# Patient Record
Sex: FEMALE | Race: OTHER | NOT HISPANIC OR LATINO | URBAN - METROPOLITAN AREA
[De-identification: names, ages, dates, MRNs, and addresses within clinical notes are randomized per-mention and may not be internally consistent; named-entity substitution may affect disease eponyms.]

---

## 2019-06-27 ENCOUNTER — OUTPATIENT (OUTPATIENT)
Dept: OUTPATIENT SERVICES | Facility: HOSPITAL | Age: 64
LOS: 1 days | Discharge: HOME | End: 2019-06-27
Payer: MEDICAID

## 2019-06-27 VITALS
TEMPERATURE: 98 F | DIASTOLIC BLOOD PRESSURE: 70 MMHG | HEIGHT: 63 IN | WEIGHT: 178.57 LBS | SYSTOLIC BLOOD PRESSURE: 160 MMHG | HEART RATE: 78 BPM | OXYGEN SATURATION: 98 % | RESPIRATION RATE: 22 BRPM

## 2019-06-27 DIAGNOSIS — Z90.49 ACQUIRED ABSENCE OF OTHER SPECIFIED PARTS OF DIGESTIVE TRACT: Chronic | ICD-10-CM

## 2019-06-27 DIAGNOSIS — Z98.891 HISTORY OF UTERINE SCAR FROM PREVIOUS SURGERY: Chronic | ICD-10-CM

## 2019-06-27 DIAGNOSIS — Z01.818 ENCOUNTER FOR OTHER PREPROCEDURAL EXAMINATION: ICD-10-CM

## 2019-06-27 DIAGNOSIS — K43.9 VENTRAL HERNIA WITHOUT OBSTRUCTION OR GANGRENE: ICD-10-CM

## 2019-06-27 LAB
ALBUMIN SERPL ELPH-MCNC: 4.1 G/DL — SIGNIFICANT CHANGE UP (ref 3.5–5.2)
ALP SERPL-CCNC: 51 U/L — SIGNIFICANT CHANGE UP (ref 30–115)
ALT FLD-CCNC: 17 U/L — SIGNIFICANT CHANGE UP (ref 0–41)
ANION GAP SERPL CALC-SCNC: 13 MMOL/L — SIGNIFICANT CHANGE UP (ref 7–14)
APPEARANCE UR: ABNORMAL
APTT BLD: 35.2 SEC — SIGNIFICANT CHANGE UP (ref 27–39.2)
AST SERPL-CCNC: 18 U/L — SIGNIFICANT CHANGE UP (ref 0–41)
BACTERIA # UR AUTO: ABNORMAL /HPF
BASOPHILS # BLD AUTO: 0.03 K/UL — SIGNIFICANT CHANGE UP (ref 0–0.2)
BASOPHILS NFR BLD AUTO: 0.6 % — SIGNIFICANT CHANGE UP (ref 0–1)
BILIRUB SERPL-MCNC: 0.7 MG/DL — SIGNIFICANT CHANGE UP (ref 0.2–1.2)
BILIRUB UR-MCNC: NEGATIVE — SIGNIFICANT CHANGE UP
BUN SERPL-MCNC: 12 MG/DL — SIGNIFICANT CHANGE UP (ref 10–20)
CALCIUM SERPL-MCNC: 9.3 MG/DL — SIGNIFICANT CHANGE UP (ref 8.5–10.1)
CHLORIDE SERPL-SCNC: 101 MMOL/L — SIGNIFICANT CHANGE UP (ref 98–110)
CO2 SERPL-SCNC: 27 MMOL/L — SIGNIFICANT CHANGE UP (ref 17–32)
COLOR SPEC: YELLOW — SIGNIFICANT CHANGE UP
CREAT SERPL-MCNC: 0.6 MG/DL — LOW (ref 0.7–1.5)
DIFF PNL FLD: ABNORMAL
EOSINOPHIL # BLD AUTO: 0.05 K/UL — SIGNIFICANT CHANGE UP (ref 0–0.7)
EOSINOPHIL NFR BLD AUTO: 1 % — SIGNIFICANT CHANGE UP (ref 0–8)
EPI CELLS # UR: ABNORMAL /HPF
GLUCOSE SERPL-MCNC: 136 MG/DL — HIGH (ref 70–99)
GLUCOSE UR QL: NEGATIVE — SIGNIFICANT CHANGE UP
HCT VFR BLD CALC: 46 % — SIGNIFICANT CHANGE UP (ref 37–47)
HGB BLD-MCNC: 14.9 G/DL — SIGNIFICANT CHANGE UP (ref 12–16)
IMM GRANULOCYTES NFR BLD AUTO: 0.4 % — HIGH (ref 0.1–0.3)
INR BLD: 1.03 RATIO — SIGNIFICANT CHANGE UP (ref 0.65–1.3)
KETONES UR-MCNC: NEGATIVE — SIGNIFICANT CHANGE UP
LEUKOCYTE ESTERASE UR-ACNC: NEGATIVE — SIGNIFICANT CHANGE UP
LYMPHOCYTES # BLD AUTO: 2.14 K/UL — SIGNIFICANT CHANGE UP (ref 1.2–3.4)
LYMPHOCYTES # BLD AUTO: 41.6 % — SIGNIFICANT CHANGE UP (ref 20.5–51.1)
MCHC RBC-ENTMCNC: 27.3 PG — SIGNIFICANT CHANGE UP (ref 27–31)
MCHC RBC-ENTMCNC: 32.4 G/DL — SIGNIFICANT CHANGE UP (ref 32–37)
MCV RBC AUTO: 84.4 FL — SIGNIFICANT CHANGE UP (ref 81–99)
MONOCYTES # BLD AUTO: 0.39 K/UL — SIGNIFICANT CHANGE UP (ref 0.1–0.6)
MONOCYTES NFR BLD AUTO: 7.6 % — SIGNIFICANT CHANGE UP (ref 1.7–9.3)
NEUTROPHILS # BLD AUTO: 2.51 K/UL — SIGNIFICANT CHANGE UP (ref 1.4–6.5)
NEUTROPHILS NFR BLD AUTO: 48.8 % — SIGNIFICANT CHANGE UP (ref 42.2–75.2)
NITRITE UR-MCNC: NEGATIVE — SIGNIFICANT CHANGE UP
NRBC # BLD: 0 /100 WBCS — SIGNIFICANT CHANGE UP (ref 0–0)
PH UR: 6 — SIGNIFICANT CHANGE UP (ref 5–8)
PLATELET # BLD AUTO: 126 K/UL — LOW (ref 130–400)
POTASSIUM SERPL-MCNC: 4.3 MMOL/L — SIGNIFICANT CHANGE UP (ref 3.5–5)
POTASSIUM SERPL-SCNC: 4.3 MMOL/L — SIGNIFICANT CHANGE UP (ref 3.5–5)
PROT SERPL-MCNC: 7.2 G/DL — SIGNIFICANT CHANGE UP (ref 6–8)
PROT UR-MCNC: 30
PROTHROM AB SERPL-ACNC: 11.8 SEC — SIGNIFICANT CHANGE UP (ref 9.95–12.87)
RBC # BLD: 5.45 M/UL — HIGH (ref 4.2–5.4)
RBC # FLD: 15.7 % — HIGH (ref 11.5–14.5)
RBC CASTS # UR COMP ASSIST: ABNORMAL /HPF
SODIUM SERPL-SCNC: 141 MMOL/L — SIGNIFICANT CHANGE UP (ref 135–146)
SP GR SPEC: 1.02 — SIGNIFICANT CHANGE UP (ref 1.01–1.03)
UROBILINOGEN FLD QL: 0.2 — SIGNIFICANT CHANGE UP (ref 0.2–0.2)
WBC # BLD: 5.14 K/UL — SIGNIFICANT CHANGE UP (ref 4.8–10.8)
WBC # FLD AUTO: 5.14 K/UL — SIGNIFICANT CHANGE UP (ref 4.8–10.8)
WBC UR QL: SIGNIFICANT CHANGE UP /HPF

## 2019-06-27 PROCEDURE — 71046 X-RAY EXAM CHEST 2 VIEWS: CPT | Mod: 26

## 2019-06-27 PROCEDURE — 93010 ELECTROCARDIOGRAM REPORT: CPT

## 2019-06-27 RX ORDER — TRAMADOL HYDROCHLORIDE 50 MG/1
1 TABLET ORAL
Qty: 0 | Refills: 0 | DISCHARGE

## 2019-06-27 RX ORDER — ISOSORBIDE DINITRATE 5 MG/1
1 TABLET ORAL
Qty: 0 | Refills: 0 | DISCHARGE

## 2019-06-27 NOTE — H&P PST ADULT - NSICDXPASTMEDICALHX_GEN_ALL_CORE_FT
PAST MEDICAL HISTORY:  H/O dull chest pain     HTN (hypertension)     Hypercholesteremia     Rheumatoid arthritis

## 2019-07-01 ENCOUNTER — RESULT REVIEW (OUTPATIENT)
Age: 64
End: 2019-07-01

## 2019-07-01 ENCOUNTER — OUTPATIENT (OUTPATIENT)
Dept: OUTPATIENT SERVICES | Facility: HOSPITAL | Age: 64
LOS: 1 days | Discharge: HOME | End: 2019-07-01
Payer: MEDICAID

## 2019-07-01 VITALS
RESPIRATION RATE: 84 BRPM | WEIGHT: 175.93 LBS | SYSTOLIC BLOOD PRESSURE: 107 MMHG | OXYGEN SATURATION: 94 % | TEMPERATURE: 97 F | HEIGHT: 63 IN | DIASTOLIC BLOOD PRESSURE: 70 MMHG | HEART RATE: 18 BPM

## 2019-07-01 VITALS — RESPIRATION RATE: 16 BRPM | HEART RATE: 67 BPM | DIASTOLIC BLOOD PRESSURE: 70 MMHG | SYSTOLIC BLOOD PRESSURE: 125 MMHG

## 2019-07-01 DIAGNOSIS — Z98.890 OTHER SPECIFIED POSTPROCEDURAL STATES: Chronic | ICD-10-CM

## 2019-07-01 DIAGNOSIS — Z90.49 ACQUIRED ABSENCE OF OTHER SPECIFIED PARTS OF DIGESTIVE TRACT: Chronic | ICD-10-CM

## 2019-07-01 DIAGNOSIS — Z98.891 HISTORY OF UTERINE SCAR FROM PREVIOUS SURGERY: Chronic | ICD-10-CM

## 2019-07-01 PROCEDURE — 88302 TISSUE EXAM BY PATHOLOGIST: CPT | Mod: 26

## 2019-07-01 RX ORDER — ONDANSETRON 8 MG/1
4 TABLET, FILM COATED ORAL ONCE
Refills: 0 | Status: COMPLETED | OUTPATIENT
Start: 2019-07-01 | End: 2019-07-01

## 2019-07-01 RX ORDER — ROSUVASTATIN CALCIUM 5 MG/1
1 TABLET ORAL
Qty: 0 | Refills: 0 | DISCHARGE

## 2019-07-01 RX ORDER — OXYCODONE HYDROCHLORIDE 5 MG/1
1 TABLET ORAL
Qty: 28 | Refills: 0
Start: 2019-07-01 | End: 2019-07-07

## 2019-07-01 RX ORDER — HYDROMORPHONE HYDROCHLORIDE 2 MG/ML
0.5 INJECTION INTRAMUSCULAR; INTRAVENOUS; SUBCUTANEOUS
Refills: 0 | Status: DISCONTINUED | OUTPATIENT
Start: 2019-07-01 | End: 2019-07-01

## 2019-07-01 RX ORDER — ISOSORBIDE MONONITRATE 60 MG/1
1 TABLET, EXTENDED RELEASE ORAL
Qty: 0 | Refills: 0 | DISCHARGE

## 2019-07-01 RX ORDER — LISINOPRIL 2.5 MG/1
1 TABLET ORAL
Qty: 0 | Refills: 0 | DISCHARGE

## 2019-07-01 RX ORDER — ASPIRIN/CALCIUM CARB/MAGNESIUM 324 MG
1 TABLET ORAL
Qty: 0 | Refills: 0 | DISCHARGE

## 2019-07-01 RX ORDER — ACETAMINOPHEN 500 MG
650 TABLET ORAL ONCE
Refills: 0 | Status: DISCONTINUED | OUTPATIENT
Start: 2019-07-01 | End: 2019-07-16

## 2019-07-01 RX ORDER — OXYCODONE AND ACETAMINOPHEN 5; 325 MG/1; MG/1
1 TABLET ORAL ONCE
Refills: 0 | Status: DISCONTINUED | OUTPATIENT
Start: 2019-07-01 | End: 2019-07-01

## 2019-07-01 RX ORDER — CHOLECALCIFEROL (VITAMIN D3) 125 MCG
1 CAPSULE ORAL
Qty: 0 | Refills: 0 | DISCHARGE

## 2019-07-01 RX ADMIN — ONDANSETRON 4 MILLIGRAM(S): 8 TABLET, FILM COATED ORAL at 16:47

## 2019-07-01 RX ADMIN — HYDROMORPHONE HYDROCHLORIDE 0.5 MILLIGRAM(S): 2 INJECTION INTRAMUSCULAR; INTRAVENOUS; SUBCUTANEOUS at 16:04

## 2019-07-01 NOTE — CHART NOTE - NSCHARTNOTEFT_GEN_A_CORE
PACU ANESTHESIA ADMISSION NOTE      Procedure:   Post op diagnosis:      ____  Intubated  TV:______       Rate: ______      FiO2: ______    ___x_  Patent Airway    __x__  Full return of protective reflexes    __x__  Full recovery from anesthesia / back to baseline status    Vitals:  T(C): 36.2 (07-01-19 @ 13:52), Max: 36.2 (07-01-19 @ 12:00)  HR: 18 (07-01-19 @ 13:52) (18 - 18)  BP: 107/70 (07-01-19 @ 13:52) (107/70 - 107/70)  RR: 84 (07-01-19 @ 13:52) (84 - 84)  SpO2: 94% (07-01-19 @ 13:52) (94% - 94%)    Mental Status:  _x___ Awake   __x___ Alert   _____ Drowsy   _____ Sedated    Nausea/Vomiting:  ____ NO  ___x___Yes,   See Post - Op Orders          Pain Scale (0-10):  _____    Treatment: ____ None    __x__ See Post - Op/PCA Orders    Post - Operative Fluids:   ____ Oral   __x__ See Post - Op Orders    Plan: Discharge:   x____Home       _____Floor     _____Critical Care    _____  Other:_________________    Comments: uneventful anesthesia course no complications. VItals stable. Pt transferred to PACU

## 2019-07-01 NOTE — ASU DISCHARGE PLAN (ADULT/PEDIATRIC) - CARE PROVIDER_API CALL
Iván Loyola)  Surgery  [address] [place] [address]  Phone: [phone]  Fax: [phone]  Follow Up Time:

## 2019-07-01 NOTE — PRE-ANESTHESIA EVALUATION ADULT - NSANTHOSAYNRD_GEN_A_CORE
see jared tool/No. JARED screening performed.  STOP BANG Legend: 0-2 = LOW Risk; 3-4 = INTERMEDIATE Risk; 5-8 = HIGH Risk

## 2019-07-01 NOTE — ASU DISCHARGE PLAN (ADULT/PEDIATRIC) - MEDICATION INSTRUCTIONS
tylenol 650 mg po q 8 hrs prn for mild pain and or ibuprofen 600 mg po q 8 hrs prn for moderate pain , oxycodone 5 mg po q 8 hrs prn for severe pain

## 2019-07-03 LAB — SURGICAL PATHOLOGY STUDY: SIGNIFICANT CHANGE UP

## 2019-07-09 DIAGNOSIS — K43.9 VENTRAL HERNIA WITHOUT OBSTRUCTION OR GANGRENE: ICD-10-CM

## 2020-01-06 NOTE — H&P PST ADULT - HISTORY OF PRESENT ILLNESS
Detail Level: Detailed Hide Additional Notes?: No 63 y/o female scheduled for ventral hernia repair with mesh  reports no c/o cp,sob,palpitations,cough or dysuria  1-2 fos without sob

## 2021-05-08 PROBLEM — M06.9 RHEUMATOID ARTHRITIS, UNSPECIFIED: Chronic | Status: ACTIVE | Noted: 2019-06-27

## 2021-05-08 PROBLEM — E78.00 PURE HYPERCHOLESTEROLEMIA, UNSPECIFIED: Chronic | Status: ACTIVE | Noted: 2019-06-27

## 2021-05-08 PROBLEM — I10 ESSENTIAL (PRIMARY) HYPERTENSION: Chronic | Status: ACTIVE | Noted: 2019-06-27

## 2021-05-08 PROBLEM — Z87.898 PERSONAL HISTORY OF OTHER SPECIFIED CONDITIONS: Chronic | Status: ACTIVE | Noted: 2019-06-27

## 2021-06-28 PROBLEM — Z00.00 ENCOUNTER FOR PREVENTIVE HEALTH EXAMINATION: Status: ACTIVE | Noted: 2021-06-28

## 2021-07-06 ENCOUNTER — APPOINTMENT (OUTPATIENT)
Dept: ENDOCRINOLOGY | Facility: CLINIC | Age: 66
End: 2021-07-06

## 2021-11-29 ENCOUNTER — APPOINTMENT (OUTPATIENT)
Dept: ENDOCRINOLOGY | Facility: CLINIC | Age: 66
End: 2021-11-29

## 2022-08-31 ENCOUNTER — APPOINTMENT (OUTPATIENT)
Dept: OBGYN | Facility: CLINIC | Age: 67
End: 2022-08-31

## 2022-08-31 ENCOUNTER — NON-APPOINTMENT (OUTPATIENT)
Age: 67
End: 2022-08-31

## 2022-08-31 VITALS
HEIGHT: 63 IN | BODY MASS INDEX: 31.18 KG/M2 | DIASTOLIC BLOOD PRESSURE: 82 MMHG | SYSTOLIC BLOOD PRESSURE: 120 MMHG | WEIGHT: 176 LBS | HEART RATE: 84 BPM

## 2022-08-31 DIAGNOSIS — N85.2 HYPERTROPHY OF UTERUS: ICD-10-CM

## 2022-08-31 LAB
BILIRUB UR QL STRIP: NEGATIVE
CLARITY UR: CLEAR
COLLECTION METHOD: NORMAL
GLUCOSE UR-MCNC: NEGATIVE
HCG UR QL: 0.2 EU/DL
HGB UR QL STRIP.AUTO: NORMAL
KETONES UR-MCNC: NEGATIVE
LEUKOCYTE ESTERASE UR QL STRIP: NORMAL
NITRITE UR QL STRIP: NEGATIVE
PH UR STRIP: 5.5
PROT UR STRIP-MCNC: NEGATIVE
SP GR UR STRIP: 1.02

## 2022-08-31 PROCEDURE — 81003 URINALYSIS AUTO W/O SCOPE: CPT | Mod: QW

## 2022-08-31 PROCEDURE — G0402 INITIAL PREVENTIVE EXAM: CPT

## 2022-08-31 PROCEDURE — 76830 TRANSVAGINAL US NON-OB: CPT

## 2022-08-31 NOTE — PROCEDURE
[Pelvic Pain] : pelvic pain [Transvaginal Ultrasound] : transvaginal ultrasound [Not Visualized] : not visualized [FreeTextEntry3] : uterus adherent\par cannot visualize stripe or her ovaries\par cervix is normal\par no free rvfwr149.3cc volume [FreeTextEntry5] : 107.3 cc volume

## 2022-08-31 NOTE — HISTORY OF PRESENT ILLNESS
[FreeTextEntry1] : here for annual exam\par last visit 5 years ago\par hx of a rectocele and an adherent uterus to ant abdominal wall\par \par Today has frequency and urgency\par \par last visit\par \par Patient\par Name	REAL GARCIA (61yo, F) ID# [id number]	Appt. Date/Time	2017 06:45PM\par 	1955	Service Dept.	[facility]\par Provider	DARYN BROWN MD\par Insurance	\par Med Primary: [employer] - PCP ONLY (MEDICAID REPLACEMENT - HMO)\par Insurance # : [id number]\par Prescription: CMX - Member is eligible. details\par Chief Complaint\par annual gyn exam\par \par last annual 10 years ago/pressure in rectum\par Patient's Pharmacies\par [id number] ([facility]): [address] [place] [address], Ph [phone], Fax [phone]\par Vitals\par Ht:	5 ft 3 in 2017 07:25 pm\par Wt:	181 lbs 2017 07:26 pm\par BMI:	32.1 2017 07:26 pm\par BP:	118/80 sitting R arm 2017 07:26 pm\par Allergies\par Reviewed Allergies\par NKDA\par Medications\par Reviewed Medications\par alendronate 35 mg tablet\par 17   filled	[facility]\par ammonium lactate 12 % topical cream\par 17   filled	[facility]\par aspirin 81 mg tablet,delayed release\par 17   filled	[facility]\par cyclobenzaprine 10 mg tablet\par 17   filled	[facility]\par diclofenac 1 % topical gel\par 17   filled	[facility]\par isosorbide mononitrate ER 30 mg tablet,extended release 24 hr\par 17   filled	[facility]\par levoFLOXacin 500 mg tablet\par 17   filled	[facility]\par meloxicam 15 mg tablet\par 17   filled	[facility]\par metoprolol succinate ER 25 mg tablet,extended release 24 hr\par 10/18/16   filled	[facility]\par metoprolol succinate ER 50 mg tablet,extended release 24 hr\par 17   filled	[facility]\par rosuvastatin 20 mg tablet\par 17   filled	[facility]\par silver sulfadiazine 1 % topical cream\par 17   filled	[facility]\par traMADol 50 mg tablet\par 17   filled	[facility]\par Vitamin D2 50,000 unit capsule\par 07/15/16   filled	[facility]\par Problems\par Reviewed Problems\par Family History\par Reviewed Family History\par Mother	- Diabetes mellitus\par Father	- Intestinal obstruction\par Social History\par Reviewed Social History\par Smoking Status: Never smoker\par Surgical History\par Other - hernia\par Other - hemorrhoidectomy\par Cholecystectomy\par Caesarean Section\par GYN History\par Reviewed GYN History\par Date of LMP: (Notes: 6 to 7 years ago).\par Obstetric History\par Reviewed Obstetric History\par TOTAL	FULL	PRE	AB. I	AB. S	ECTOPICS	MULTIPLE	LIVING\par 2	2						2\par 1 ...small\par 1 9.5 lb cs\par Past Medical History\par High Blood Pressure: Y\par Heart Disease: (no answer) - cholesterol\par Notes: osteoporosis\par Screening\par None recorded.\par HPI\par pressure in rectum\par \par ROS\par Patient reports no heartburn, no dysphagia, no nausea, no vomiting, no abdominal pain, no bowel movement changes, no diarrhea, no constipation, and no rectal bleeding; RECTAL PRESSURE. She reports no fatigue, no fever, no significant weight gain, and no significant weight loss. She reports no abnormal moles and no rashes. She reports no irritation and no vision changes. She reports no hearing loss, no ear pain, no nose/sinus problems, no sore throat, no snoring, no dry mouth, and no mouth ulcers. She reports no dyspnea / shortness of breath, no cough, no sputum production, no hemoptysis, and no wheezing. She reports no chest pain, no palpitations, and no orthopnea. She reports no hematuria, no abnormal bleeding, no flank pain, no trouble urinating, no incontinence, no rash, no lesion, no discharge, no vaginal odor, and no vaginal itching. She reports no menstrual problems and no PMDD symptoms. She reports no menopausal symptoms. She reports no sexual problems. She reports no muscle aches, no muscle weakness, no arthralgias/joint pain, and no back pain. She reports no headaches, no dizziness, no LOC, no weakness, no numbness, and no seizures. She reports no depression, no alcoholism, and no sleep disturbances.\par Physical Exam\par Patient is a 62-year-old female.\par \par Chaperone: Chaperone: present.\par \par Female Genitalia: Vulva: no masses, atrophy, or lesions. Bladder/Urethra: no urethral discharge or mass and normal meatus and bladder non distended. Vagina no tenderness, erythema, cystocele, abnormal vaginal discharge, or vesicle(s) or ulcers and rectocele; moderate. Cervix: no discharge or cervical motion tenderness and grossly normal. Uterus: midline, mobile, non-tender, normal shape, no uterine prolapse, and enlarged; attached to anterior abdominal wall, PALPATED JUST BELOW UMBILICUS. Adnexa/Parametria: no parametrial tenderness or mass and no adnexal tenderness or ovarian mass.\par \par Breast: Inspection/Palpation: no erythema, induration, tenderness, skin changes, abnormal secretions, or distinct masses and normal nipple appearance and non tender axillary lymph nodes.\par \par Abdomen: Auscultation/Inspection/Palpation: no tenderness, hepatomegaly, splenomegaly, masses, or CVA tenderness and soft, non-distended, and normal bowel sounds. Hernia: none palpated.\par Assessment / Plan\par 1. Gynecologic examination\par Z01.411: Encounter for gynecological examination (general) (routine) with abnormal findings\par PAP, LB\par MAMMO, SCREENING, BILATERAL\par \par 2. Herniation of rectum into vagina -\par MODERATE RECTOCELE\par \par IF PROBLEM BECOMES A LIFE QUALITY ISSUE, CONSIDER RECTOCELE REPAIR (POSTERIOR COLPORR?APHY?)\par \par N81.6: Rectocele\par \par 3. Hypertrophy of uterus -\par UTERUS ADHERENT TO ANTERIOR ABDOMINAL WALL...PREVENTING PROLAPSE\par \par N85.2: Hypertrophy of uterus\par US, TRANSVAGINAL\par \par US, TRANSVAGINAL\par \par Review of us, transvaginal taken on 2017 at IN-OFFICE ORDER shows:\par Imaging Studies:\par Indications: UT HYPERTROPHY.\par \par Return to Office\par None recorded.\par Encounter Sign-Off\par Encounter signed-off by Daryn Brown MD, 2017.\par Encounter performed and documented by Daryn Brown MD\par Encounter reviewed & signed by Daryn Brown MD on 2017 at 8:52pm

## 2022-08-31 NOTE — PHYSICAL EXAM
[Examination Of The Breasts] : a normal appearance [No Masses] : no breast masses were palpable [Labia Majora] : normal [Labia Minora] : normal [Rectocele] : a rectocele [Normal] : normal [Enlarged ___ wks] : enlarged [unfilled] ~Uweeks [Uterine Adnexae] : normal [FreeTextEntry6] : attached to anterior abdominal wall

## 2022-09-06 LAB — BACTERIA UR CULT: NORMAL

## 2023-02-05 ENCOUNTER — LABORATORY RESULT (OUTPATIENT)
Age: 68
End: 2023-02-05

## 2023-02-06 ENCOUNTER — APPOINTMENT (OUTPATIENT)
Dept: UROGYNECOLOGY | Facility: CLINIC | Age: 68
End: 2023-02-06
Payer: MEDICARE

## 2023-02-06 VITALS
BODY MASS INDEX: 30.12 KG/M2 | WEIGHT: 170 LBS | DIASTOLIC BLOOD PRESSURE: 75 MMHG | HEART RATE: 75 BPM | SYSTOLIC BLOOD PRESSURE: 124 MMHG | HEIGHT: 63 IN

## 2023-02-06 DIAGNOSIS — N81.6 RECTOCELE: ICD-10-CM

## 2023-02-06 DIAGNOSIS — Z01.419 ENCOUNTER FOR GYNECOLOGICAL EXAMINATION (GENERAL) (ROUTINE) W/OUT ABNORMAL FINDINGS: ICD-10-CM

## 2023-02-06 DIAGNOSIS — R39.15 URGENCY OF URINATION: ICD-10-CM

## 2023-02-06 DIAGNOSIS — Z86.79 PERSONAL HISTORY OF OTHER DISEASES OF THE CIRCULATORY SYSTEM: ICD-10-CM

## 2023-02-06 DIAGNOSIS — N95.2 POSTMENOPAUSAL ATROPHIC VAGINITIS: ICD-10-CM

## 2023-02-06 DIAGNOSIS — N39.0 URINARY TRACT INFECTION, SITE NOT SPECIFIED: ICD-10-CM

## 2023-02-06 DIAGNOSIS — Z86.39 PERSONAL HISTORY OF OTHER ENDOCRINE, NUTRITIONAL AND METABOLIC DISEASE: ICD-10-CM

## 2023-02-06 DIAGNOSIS — R10.2 PELVIC AND PERINEAL PAIN: ICD-10-CM

## 2023-02-06 PROCEDURE — 51701 INSERT BLADDER CATHETER: CPT

## 2023-02-06 PROCEDURE — 99205 OFFICE O/P NEW HI 60 MIN: CPT | Mod: 25

## 2023-02-06 RX ORDER — CIPROFLOXACIN HYDROCHLORIDE 500 MG/1
500 TABLET, FILM COATED ORAL
Qty: 14 | Refills: 1 | Status: COMPLETED | COMMUNITY
Start: 2022-08-31 | End: 2023-02-06

## 2023-02-06 NOTE — HISTORY OF PRESENT ILLNESS
[FreeTextEntry1] : 67 year para 2 (c/sx1, NSVDx1) presents with complaints of pelvic pain and bulge\par \par 8/31/2022 - TVUS: Procedure Performed: transvaginal ultrasound. \par Comments: uterus adherent\par cannot visualize stripe or her ovaries\par cervix is normal\par no free rmtgc688.3cc volume. \par Uterus Size: \par 107.3 cc volume.\par Ovaries: not visualized \par \par Pelvic organ prolapse: + bulge, + pressure/heaviness, duration 7 years \par \par Stress urinary incontinence: 0x/week, no prior incontinence procedures\par \par Overactive bladder syndrome: daily frequency 8x/day, 1x/night,  + urgency,  0x/week UUI episodes, 0 pads/day     Bladder irritants include tea, chocolate.   Prior OAB meds: none\par \par Voiding dysfunction: + Incomplete bladder emptying, no hesitancy\par \par Lower urinary tract/vaginal symptoms: 0 UTIs per year, no hematuria, no dysuria, no bladder pain\par \par 7 BM/week   no constipation   Fecal incontinence: denies\par \par Sexually active: yes Dyspareunia: no Pelvic pain: yes Vaginal dryness: no LMP: no 52 PMB:no\par

## 2023-02-06 NOTE — ASSESSMENT
[FreeTextEntry1] : Posterior vaginal wall prolapse -\par Patient with Stage II prolapse isolated to posterior vaginal wall. She is currently asymptomatic from the prolapse. The patient was counseled regarding the possible natural progression of prolapse and the clinical consequences of worsening prolapse. The stage and the location of the prolapse was reviewed with the patient and her daughter. She was counseled regarding the management strategies including observation, pelvic floor physical therapy, pessary placement and surgery. The patient would like to proceed with observation at this time.\par \par Pelvic pain -\par Cause of her pain could not be elicited on exam. Reviewed results of her most recent US (performed at Dr. Hightower's office). She will follow up with GI for further workup.\par \par Urinary urgency -\par Will follow up on UA (from clean catch) and UCx (from cath).\par

## 2023-02-06 NOTE — PHYSICAL EXAM
[Chaperone Present] : A chaperone was present in the examining room during all aspects of the physical examination [FreeTextEntry1] : Void: 75 cc\par \par PVR: 5 cc\par \par Urethra was prepped in sterile fashion and then a sterile 14F catheter was used by me to drain the bladder for her symptoms of prolapse. Patient tolerated the procedure well\par \par Well healed incision: vertical, Pfannenstiel\par \par  Ap: +1 Bp: +1\par \par negative empty cough stress test\par \par + atrophy\par \par no urethral caruncle\par \par no vestibular tenderness\par \par + prolapse\par \par + urethral hypermobility\par \par no pelvic floor dysfunction\par \par no urethral tenderness\par \par no bladder tenderness\par \par normal cervix\par \par unable to palpate uterus\par \par adnexa nonpalpable\par \par good sphincter tone\par \par no enterocele\par \par good rectal squeeze\par \par intact sacral nerves\par

## 2023-02-09 LAB
APPEARANCE: ABNORMAL
BILIRUBIN URINE: NEGATIVE
BLOOD URINE: NEGATIVE
COLOR: YELLOW
GLUCOSE QUALITATIVE U: NEGATIVE
KETONES URINE: NEGATIVE
LEUKOCYTE ESTERASE URINE: NEGATIVE
NITRITE URINE: POSITIVE
PH URINE: 6
PROTEIN URINE: NEGATIVE
SPECIFIC GRAVITY URINE: 1.02
UROBILINOGEN URINE: NORMAL

## 2023-02-10 DIAGNOSIS — N39.0 URINARY TRACT INFECTION, SITE NOT SPECIFIED: ICD-10-CM

## 2023-02-10 LAB — URINE CULTURE <10: ABNORMAL

## 2023-02-10 RX ORDER — FLUCONAZOLE 200 MG/1
200 TABLET ORAL DAILY
Qty: 14 | Refills: 0 | Status: ACTIVE | COMMUNITY
Start: 2023-02-10 | End: 1900-01-01

## 2024-01-08 ENCOUNTER — APPOINTMENT (OUTPATIENT)
Dept: ORTHOPEDIC SURGERY | Facility: CLINIC | Age: 69
End: 2024-01-08
Payer: MEDICARE

## 2024-01-08 VITALS — HEIGHT: 60 IN | BODY MASS INDEX: 31.41 KG/M2 | WEIGHT: 160 LBS

## 2024-01-08 PROCEDURE — 72100 X-RAY EXAM L-S SPINE 2/3 VWS: CPT

## 2024-01-08 PROCEDURE — 73502 X-RAY EXAM HIP UNI 2-3 VIEWS: CPT

## 2024-01-08 PROCEDURE — 99203 OFFICE O/P NEW LOW 30 MIN: CPT

## 2024-01-08 PROCEDURE — 73590 X-RAY EXAM OF LOWER LEG: CPT | Mod: RT

## 2024-01-08 RX ORDER — METOPROLOL SUCCINATE 200 MG/1
TABLET, EXTENDED RELEASE ORAL
Refills: 0 | Status: ACTIVE | COMMUNITY

## 2024-01-08 RX ORDER — NABUMETONE 500 MG/1
500 TABLET, FILM COATED ORAL
Qty: 60 | Refills: 0 | Status: ACTIVE | COMMUNITY
Start: 2024-01-08 | End: 1900-01-01

## 2024-01-08 RX ORDER — TIZANIDINE 4 MG/1
4 TABLET ORAL
Qty: 30 | Refills: 0 | Status: ACTIVE | COMMUNITY
Start: 2024-01-08 | End: 1900-01-01

## 2024-01-08 NOTE — DISCUSSION/SUMMARY
[de-identified] : At this time I discussed with the patient and her family I believe all of her pain is coming from her lower back.  I sent a prescription for an anti-inflammatory and a muscle relaxant to the pharmacy.  I gave her a new prescription for physical therapy.  I recommend she do some warm compresses.  Will schedule her follow-up in 4 to 6 weeks for repeat evaluation if the pain is not improving.  Patient will call me if any other problems or concerns.  Patient verbalized understanding and agreed with the plan, all questions were answered in the office today.

## 2024-01-08 NOTE — HISTORY OF PRESENT ILLNESS
[de-identified] : 68-year-old female is here today for evaluation of her right leg.  Patient states she has been having pain in her right leg she states the pain starts in her right buttock and radiates around into her right thigh.  She states now she is also been having pain in her right lower leg.  She states the pain is worse at night and when she is lying down.  She had no recent injury or trauma.  She does see a pain management doctor, she is prescribed gabapentin.  Patient's daughter states she did have an MRI of her back about 7 or 8 months ago and the doctor said she had a herniated disc.  She states she was doing physical therapy but stopped going about 4 months ago.  She denies any numbness or tingling in her leg.  She denies any calf pain.

## 2024-01-08 NOTE — IMAGING
[de-identified] : On examination she has no midline tenderness over the lumbar spine.  She is nontender over the paraspinal muscles.  She does have some mild tenderness over the right buttock.  She has no tenderness to palpation over the right groin.  Negative logroll.  She has full range of motion of the right hip without pain.  Positive straight leg raise on the right.  No tenderness palpation of the right knee.  She has no tenderness over the patella, negative patellar grind.  She is able to straight leg raise.  She can flex and extend the knee without pain.  Negative varus valgus stress test, negative anterior drawer.  No point tenderness to palpation over the anterior tibia or fibula.  The calf is soft and nontender.  She is able to dorsiflex and plantarflex ankle, sensation is intact throughout, 2+ DP and PT pulses.  X-rays taken in the office today of the lumbar spine show degenerative changes throughout the lower thoracic and lower lumbar spine.  Specifically at L4-L5 L5-S1.  No acute fractures or other bony abnormalities noted. X-rays taken of the right hip show no acute fractures, dislocations, or other bony abnormalities. X-rays taken of the right tib-fib show no acute fractures, dislocations, or other bony abnormalities.

## 2024-02-05 ENCOUNTER — APPOINTMENT (OUTPATIENT)
Dept: ORTHOPEDIC SURGERY | Facility: CLINIC | Age: 69
End: 2024-02-05
Payer: MEDICARE

## 2024-02-05 DIAGNOSIS — M54.16 RADICULOPATHY, LUMBAR REGION: ICD-10-CM

## 2024-02-05 PROCEDURE — 99204 OFFICE O/P NEW MOD 45 MIN: CPT

## 2024-02-05 RX ORDER — METHOCARBAMOL 750 MG/1
750 TABLET, FILM COATED ORAL
Qty: 30 | Refills: 2 | Status: ACTIVE | COMMUNITY
Start: 2024-02-05 | End: 1900-01-01

## 2024-02-05 RX ORDER — IBUPROFEN 600 MG/1
600 TABLET, FILM COATED ORAL 3 TIMES DAILY
Qty: 90 | Refills: 0 | Status: ACTIVE | COMMUNITY
Start: 2024-02-05 | End: 1900-01-01

## 2024-02-05 NOTE — IMAGING
[de-identified] : TTP midline spine and paraspinal musculature  Strength                                          Hip flexor   Right: 5/5; Left: 5/5                              Knee extensor     Right: 5/5; Left: 5/5                      Ankle dorsiflexion   Right: 5/5; Left: 5/5                   EHL           Right: 5/5; Left: 5/5                                 Ankle plantarflexion       Right: 5/5; Left: 5/5  Sensation L1   Right: 2/2; Left: 2/2 L2   Right: 2/2; Left: 2/2 L3   Right: 2/2; Left: 2/2 L4   Right: 2/2; Left: 2/2 L5   Right: 2/2; Left: 2/2 S1   Right: 2/2; Left: 2/2  Reflexes Patella   Right: 2+; Left 2+ Achilles   Right: 2+; Left 2+ Clonus  Right: absent; L: absent

## 2024-02-05 NOTE — DISCUSSION/SUMMARY
[de-identified] : 68-year-old female presents with lumbar radiculopathy going on for a long time now.  It is recalcitrant, cervical care.  I stressed the importance of doing physical therapy.  I refilled her ibuprofen and methocarbamol she will follow-up with her pain management doctor to discuss cortisone injections.  I discussed with the patient prior to come see me in 3 months and to bring in the CD of her MRI at that time for us to review.

## 2024-02-05 NOTE — HISTORY OF PRESENT ILLNESS
[de-identified] : 68-year-old female presents to me with many months of low back pain radiating down her entire right leg with numbness and tingling.  He has been in some physical therapy does not help her.  She stopped going to physical therapy shots to cold outside.  She takes ibuprofen which mildly helps her she like a refill of that she also like a refill on her muscle relaxant.  No loss of bladder or bowel.  She had an MRI done by her pain management doctor Dr Rodriguez about 8 months ago.  She does not have the results with her.  According to daughter there is a herniated disc loss of bladder bowel.

## 2024-02-05 NOTE — DATA REVIEWED
[FreeTextEntry1] : I reviewed the patient's AP and lateral lumbar x-rays.  Significant disc degeneration and facet arthritis.

## 2024-02-12 ENCOUNTER — APPOINTMENT (OUTPATIENT)
Dept: ORTHOPEDIC SURGERY | Facility: CLINIC | Age: 69
End: 2024-02-12

## 2024-05-13 ENCOUNTER — APPOINTMENT (OUTPATIENT)
Dept: ORTHOPEDIC SURGERY | Facility: CLINIC | Age: 69
End: 2024-05-13

## 2025-04-28 ENCOUNTER — APPOINTMENT (OUTPATIENT)
Dept: GASTROENTEROLOGY | Facility: CLINIC | Age: 70
End: 2025-04-28
Payer: MEDICARE

## 2025-04-28 DIAGNOSIS — Z12.11 ENCOUNTER FOR SCREENING FOR MALIGNANT NEOPLASM OF COLON: ICD-10-CM

## 2025-04-28 DIAGNOSIS — Z87.19 PERSONAL HISTORY OF OTHER DISEASES OF THE DIGESTIVE SYSTEM: ICD-10-CM

## 2025-04-28 DIAGNOSIS — R10.811 RIGHT UPPER QUADRANT ABDOMINAL TENDERNESS: ICD-10-CM

## 2025-04-28 DIAGNOSIS — Z78.9 OTHER SPECIFIED HEALTH STATUS: ICD-10-CM

## 2025-04-28 DIAGNOSIS — R10.10 UPPER ABDOMINAL PAIN, UNSPECIFIED: ICD-10-CM

## 2025-04-28 DIAGNOSIS — L29.0 PRURITUS ANI: ICD-10-CM

## 2025-04-28 DIAGNOSIS — Z86.79 PERSONAL HISTORY OF OTHER DISEASES OF THE CIRCULATORY SYSTEM: ICD-10-CM

## 2025-04-28 DIAGNOSIS — K80.20 CALCULUS OF GALLBLADDER W/OUT CHOLECYSTITIS W/OUT OBSTRUCTION: ICD-10-CM

## 2025-04-28 DIAGNOSIS — R14.3 FLATULENCE: ICD-10-CM

## 2025-04-28 DIAGNOSIS — K62.89 OTHER SPECIFIED DISEASES OF ANUS AND RECTUM: ICD-10-CM

## 2025-04-28 DIAGNOSIS — R12 HEARTBURN: ICD-10-CM

## 2025-04-28 DIAGNOSIS — Z86.39 PERSONAL HISTORY OF OTHER ENDOCRINE, NUTRITIONAL AND METABOLIC DISEASE: ICD-10-CM

## 2025-04-28 PROCEDURE — 99204 OFFICE O/P NEW MOD 45 MIN: CPT

## 2025-04-28 RX ORDER — GABAPENTIN 300 MG
300 TABLET ORAL
Refills: 0 | Status: ACTIVE | COMMUNITY

## 2025-04-28 RX ORDER — METOPROLOL SUCCINATE 50 MG/1
50 TABLET, EXTENDED RELEASE ORAL
Refills: 0 | Status: ACTIVE | COMMUNITY

## 2025-04-28 RX ORDER — IBUPROFEN 800 MG/1
800 TABLET, FILM COATED ORAL
Refills: 0 | Status: ACTIVE | COMMUNITY

## 2025-04-28 RX ORDER — GABAPENTIN 300 MG/1
300 CAPSULE ORAL
Refills: 0 | Status: ACTIVE | COMMUNITY

## 2025-04-28 RX ORDER — CALCIUM CARB/VIT D3/MINERALS 1200-1000
1200-1000 TABLET,CHEWABLE ORAL
Refills: 0 | Status: ACTIVE | COMMUNITY

## 2025-04-28 RX ORDER — MINOXIDIL 2.5 MG/1
2.5 TABLET ORAL
Refills: 0 | Status: ACTIVE | COMMUNITY

## 2025-04-28 RX ORDER — CYCLOBENZAPRINE 10 MG/1
10 TABLET ORAL
Refills: 0 | Status: ACTIVE | COMMUNITY

## 2025-04-28 RX ORDER — VITAMIN E (DL,TOCOPHERYL ACET) 180 MG
CAPSULE ORAL
Refills: 0 | Status: ACTIVE | COMMUNITY

## 2025-04-28 RX ORDER — ISOSORBIDE MONONITRATE 30 MG/1
30 TABLET, EXTENDED RELEASE ORAL
Refills: 0 | Status: ACTIVE | COMMUNITY

## 2025-04-28 RX ORDER — ASPIRIN 81 MG
81 TABLET, DELAYED RELEASE (ENTERIC COATED) ORAL
Refills: 0 | Status: ACTIVE | COMMUNITY

## 2025-04-28 RX ORDER — ROSUVASTATIN CALCIUM 20 MG/1
20 TABLET, FILM COATED ORAL
Refills: 0 | Status: ACTIVE | COMMUNITY

## 2025-04-28 RX ORDER — ISOSORBIDE MONONITRATE 30 MG
30 TABLET, EXTENDED RELEASE 24 HR ORAL
Refills: 0 | Status: ACTIVE | COMMUNITY

## 2025-04-28 RX ORDER — METFORMIN HYDROCHLORIDE 500 MG/1
500 TABLET, COATED ORAL
Refills: 0 | Status: ACTIVE | COMMUNITY

## 2025-04-28 RX ORDER — CYANOCOBALAMIN (VITAMIN B-12) 1000 MCG
1000 TABLET ORAL
Refills: 0 | Status: ACTIVE | COMMUNITY

## 2025-04-28 RX ORDER — CHOLECALCIFEROL (VITAMIN D3) 50 MCG
50 MCG TABLET ORAL
Refills: 0 | Status: ACTIVE | COMMUNITY

## 2025-04-28 RX ORDER — ICOSAPENT ETHYL 1000 MG/1
1 CAPSULE ORAL
Refills: 0 | Status: ACTIVE | COMMUNITY

## 2025-05-20 ENCOUNTER — NON-APPOINTMENT (OUTPATIENT)
Age: 70
End: 2025-05-20

## 2025-05-20 DIAGNOSIS — R10.9 UNSPECIFIED ABDOMINAL PAIN: ICD-10-CM

## 2025-06-02 ENCOUNTER — NON-APPOINTMENT (OUTPATIENT)
Age: 70
End: 2025-06-02

## 2025-06-03 RX ORDER — POLYETHYLENE GLYCOL 3350 AND ELECTROLYTES WITH LEMON FLAVOR 236; 22.74; 6.74; 5.86; 2.97 G/4L; G/4L; G/4L; G/4L; G/4L
236 POWDER, FOR SOLUTION ORAL
Qty: 1 | Refills: 0 | Status: ACTIVE | COMMUNITY
Start: 2025-06-03 | End: 1900-01-01

## 2025-06-04 ENCOUNTER — RESULT REVIEW (OUTPATIENT)
Age: 70
End: 2025-06-04

## 2025-06-04 ENCOUNTER — OUTPATIENT (OUTPATIENT)
Dept: OUTPATIENT SERVICES | Facility: HOSPITAL | Age: 70
LOS: 1 days | Discharge: HOME | End: 2025-06-04
Payer: MEDICARE

## 2025-06-04 ENCOUNTER — TRANSCRIPTION ENCOUNTER (OUTPATIENT)
Age: 70
End: 2025-06-04

## 2025-06-04 VITALS
HEART RATE: 74 BPM | SYSTOLIC BLOOD PRESSURE: 129 MMHG | RESPIRATION RATE: 18 BRPM | OXYGEN SATURATION: 97 % | DIASTOLIC BLOOD PRESSURE: 70 MMHG

## 2025-06-04 VITALS
RESPIRATION RATE: 18 BRPM | SYSTOLIC BLOOD PRESSURE: 119 MMHG | OXYGEN SATURATION: 96 % | DIASTOLIC BLOOD PRESSURE: 82 MMHG | HEART RATE: 77 BPM | HEIGHT: 60 IN | WEIGHT: 162.04 LBS | TEMPERATURE: 98 F

## 2025-06-04 DIAGNOSIS — Z98.891 HISTORY OF UTERINE SCAR FROM PREVIOUS SURGERY: Chronic | ICD-10-CM

## 2025-06-04 DIAGNOSIS — Z90.49 ACQUIRED ABSENCE OF OTHER SPECIFIED PARTS OF DIGESTIVE TRACT: Chronic | ICD-10-CM

## 2025-06-04 DIAGNOSIS — Z98.890 OTHER SPECIFIED POSTPROCEDURAL STATES: Chronic | ICD-10-CM

## 2025-06-04 DIAGNOSIS — R14.3 FLATULENCE: ICD-10-CM

## 2025-06-04 PROCEDURE — 45378 DIAGNOSTIC COLONOSCOPY: CPT

## 2025-06-04 PROCEDURE — 88305 TISSUE EXAM BY PATHOLOGIST: CPT

## 2025-06-04 PROCEDURE — 88312 SPECIAL STAINS GROUP 1: CPT

## 2025-06-04 PROCEDURE — 88312 SPECIAL STAINS GROUP 1: CPT | Mod: 26

## 2025-06-04 PROCEDURE — 88305 TISSUE EXAM BY PATHOLOGIST: CPT | Mod: 26

## 2025-06-04 PROCEDURE — 43239 EGD BIOPSY SINGLE/MULTIPLE: CPT

## 2025-06-04 RX ORDER — GABAPENTIN 400 MG/1
0 CAPSULE ORAL
Refills: 0 | DISCHARGE

## 2025-06-04 RX ORDER — METFORMIN HYDROCHLORIDE 850 MG/1
1 TABLET ORAL
Refills: 0 | DISCHARGE

## 2025-06-04 NOTE — ASU PATIENT PROFILE, ADULT - ADDITIONAL COMMENTS
well developed, well nourished , in no acute distress , ambulating without difficulty , normal communication ability confirmed with  #[id number] that pt want her daughter to interpret today.

## 2025-06-04 NOTE — ASU PATIENT PROFILE, ADULT - NSICDXPASTSURGICALHX_GEN_ALL_CORE_FT
PAST SURGICAL HISTORY:  History of appendectomy     S/P      S/P hernia repair     S/P laparoscopic cholecystectomy

## 2025-06-04 NOTE — H&P PST ADULT - HISTORY OF PRESENT ILLNESS
70yoF pmhx DM, HTN, HLD, CAD, fatty liver?. presents today for Colonoscopy and rectal pain.  ?  ?  #Acid Reflux  #RUQ Pain  #Rectal Pain/Pressure  - Here for EGD and colonoscopy  -Risks vs. benefits discussed

## 2025-06-04 NOTE — ASU DISCHARGE PLAN (ADULT/PEDIATRIC) - CARE PROVIDER_API CALL
Massiel Mtz  Gastroenterology  [address] [facility] [place] [address]  Phone: [phone]  Fax: [phone]  Follow Up Time: 2 weeks

## 2025-06-04 NOTE — ASU DISCHARGE PLAN (ADULT/PEDIATRIC) - FINANCIAL ASSISTANCE
[facility] provides services at a reduced cost to those who are determined to be eligible through [facility]’s financial assistance program. Information regarding [facility]’s financial assistance program can be found by going to [email or web address] or by calling [phone].

## 2025-06-05 LAB
SURGICAL PATHOLOGY STUDY: SIGNIFICANT CHANGE UP
SURGICAL PATHOLOGY STUDY: SIGNIFICANT CHANGE UP

## 2025-06-06 ENCOUNTER — NON-APPOINTMENT (OUTPATIENT)
Age: 70
End: 2025-06-06

## 2025-06-06 RX ORDER — OMEPRAZOLE 20 MG/1
20 CAPSULE, DELAYED RELEASE ORAL
Qty: 30 | Refills: 2 | Status: ACTIVE | COMMUNITY
Start: 2025-06-06 | End: 1900-01-01

## 2025-06-09 DIAGNOSIS — K29.50 UNSPECIFIED CHRONIC GASTRITIS WITHOUT BLEEDING: ICD-10-CM

## 2025-06-09 DIAGNOSIS — I25.10 ATHEROSCLEROTIC HEART DISEASE OF NATIVE CORONARY ARTERY WITHOUT ANGINA PECTORIS: ICD-10-CM

## 2025-06-09 DIAGNOSIS — E11.9 TYPE 2 DIABETES MELLITUS WITHOUT COMPLICATIONS: ICD-10-CM

## 2025-06-09 DIAGNOSIS — I10 ESSENTIAL (PRIMARY) HYPERTENSION: ICD-10-CM

## 2025-06-09 DIAGNOSIS — K44.9 DIAPHRAGMATIC HERNIA WITHOUT OBSTRUCTION OR GANGRENE: ICD-10-CM

## 2025-06-09 DIAGNOSIS — D12.4 BENIGN NEOPLASM OF DESCENDING COLON: ICD-10-CM

## 2025-06-09 DIAGNOSIS — E78.5 HYPERLIPIDEMIA, UNSPECIFIED: ICD-10-CM

## 2025-06-09 DIAGNOSIS — K57.30 DIVERTICULOSIS OF LARGE INTESTINE WITHOUT PERFORATION OR ABSCESS WITHOUT BLEEDING: ICD-10-CM

## 2025-06-09 DIAGNOSIS — K64.9 UNSPECIFIED HEMORRHOIDS: ICD-10-CM

## 2025-06-09 DIAGNOSIS — B96.81 HELICOBACTER PYLORI [H. PYLORI] AS THE CAUSE OF DISEASES CLASSIFIED ELSEWHERE: ICD-10-CM

## 2025-06-09 DIAGNOSIS — K21.00 GASTRO-ESOPHAGEAL REFLUX DISEASE WITH ESOPHAGITIS, WITHOUT BLEEDING: ICD-10-CM

## 2025-06-09 PROBLEM — A04.8 HELICOBACTER PYLORI (H. PYLORI) INFECTION: Status: ACTIVE | Noted: 2025-06-09

## 2025-06-09 RX ORDER — OMEPRAZOLE 40 MG/1
40 CAPSULE, DELAYED RELEASE ORAL
Qty: 28 | Refills: 0 | Status: ACTIVE | COMMUNITY
Start: 2025-06-09 | End: 1900-01-01

## 2025-06-09 RX ORDER — CLARITHROMYCIN 500 MG/1
500 TABLET, FILM COATED ORAL TWICE DAILY
Qty: 28 | Refills: 0 | Status: ACTIVE | COMMUNITY
Start: 2025-06-09 | End: 1900-01-01

## 2025-06-19 RX ORDER — AMOXICILLIN 500 MG/1
500 TABLET, FILM COATED ORAL
Qty: 16 | Refills: 0 | Status: ACTIVE | COMMUNITY
Start: 2025-06-09 | End: 1900-01-01

## 2025-08-11 ENCOUNTER — NON-APPOINTMENT (OUTPATIENT)
Age: 70
End: 2025-08-11